# Patient Record
Sex: MALE | Race: WHITE | Employment: OTHER | URBAN - METROPOLITAN AREA
[De-identification: names, ages, dates, MRNs, and addresses within clinical notes are randomized per-mention and may not be internally consistent; named-entity substitution may affect disease eponyms.]

---

## 2020-05-13 ENCOUNTER — HOSPITAL ENCOUNTER (OUTPATIENT)
Dept: PREADMISSION TESTING | Age: 80
Discharge: HOME | End: 2020-05-13
Payer: MEDICARE

## 2020-05-13 DIAGNOSIS — M16.11 PRIMARY OSTEOARTHRITIS OF RIGHT HIP: ICD-10-CM

## 2020-05-13 LAB
ALBUMIN SERPL-MCNC: 4 G/DL (ref 3.4–5)
ALBUMIN/GLOB SERPL: 1.7 {RATIO} (ref 0.8–1.7)
ALP SERPL-CCNC: 75 U/L (ref 45–117)
ALT SERPL-CCNC: 41 U/L (ref 16–61)
ANION GAP SERPL CALC-SCNC: 3 MMOL/L (ref 3–18)
APPEARANCE UR: CLEAR
APTT PPP: 33 SEC (ref 23–36.4)
AST SERPL-CCNC: 28 U/L (ref 10–38)
BASOPHILS # BLD: 0 K/UL (ref 0–0.1)
BASOPHILS NFR BLD: 0 % (ref 0–2)
BILIRUB SERPL-MCNC: 0.8 MG/DL (ref 0.2–1)
BILIRUB UR QL: NEGATIVE
BUN SERPL-MCNC: 16 MG/DL (ref 7–18)
BUN/CREAT SERPL: 14 (ref 12–20)
CALCIUM SERPL-MCNC: 9 MG/DL (ref 8.5–10.1)
CHLORIDE SERPL-SCNC: 106 MMOL/L (ref 100–111)
CO2 SERPL-SCNC: 32 MMOL/L (ref 21–32)
COLOR UR: YELLOW
CREAT SERPL-MCNC: 1.13 MG/DL (ref 0.6–1.3)
DIFFERENTIAL METHOD BLD: ABNORMAL
EOSINOPHIL # BLD: 0.1 K/UL (ref 0–0.4)
EOSINOPHIL NFR BLD: 2 % (ref 0–5)
ERYTHROCYTE [DISTWIDTH] IN BLOOD BY AUTOMATED COUNT: 13.8 % (ref 11.6–14.5)
ERYTHROCYTE [SEDIMENTATION RATE] IN BLOOD: 3 MM/HR (ref 0–20)
EST. AVERAGE GLUCOSE BLD GHB EST-MCNC: 143 MG/DL
GLOBULIN SER CALC-MCNC: 2.3 G/DL (ref 2–4)
GLUCOSE SERPL-MCNC: 106 MG/DL (ref 74–99)
GLUCOSE UR STRIP.AUTO-MCNC: NEGATIVE MG/DL
HBA1C MFR BLD: 6.6 % (ref 4.2–5.6)
HCT VFR BLD AUTO: 43.8 % (ref 36–48)
HGB BLD-MCNC: 14.4 G/DL (ref 13–16)
HGB UR QL STRIP: NEGATIVE
INR PPP: 2.1 (ref 0.8–1.2)
KETONES UR QL STRIP.AUTO: NEGATIVE MG/DL
LEUKOCYTE ESTERASE UR QL STRIP.AUTO: NEGATIVE
LYMPHOCYTES # BLD: 1.8 K/UL (ref 0.9–3.6)
LYMPHOCYTES NFR BLD: 30 % (ref 21–52)
MCH RBC QN AUTO: 30.3 PG (ref 24–34)
MCHC RBC AUTO-ENTMCNC: 32.9 G/DL (ref 31–37)
MCV RBC AUTO: 92.2 FL (ref 74–97)
MONOCYTES # BLD: 0.5 K/UL (ref 0.05–1.2)
MONOCYTES NFR BLD: 8 % (ref 3–10)
NEUTS SEG # BLD: 3.6 K/UL (ref 1.8–8)
NEUTS SEG NFR BLD: 60 % (ref 40–73)
NITRITE UR QL STRIP.AUTO: NEGATIVE
PH UR STRIP: 7 [PH] (ref 5–8)
PLATELET # BLD AUTO: 111 K/UL (ref 135–420)
PMV BLD AUTO: 11.5 FL (ref 9.2–11.8)
POTASSIUM SERPL-SCNC: 4.2 MMOL/L (ref 3.5–5.5)
PROT SERPL-MCNC: 6.3 G/DL (ref 6.4–8.2)
PROT UR STRIP-MCNC: NEGATIVE MG/DL
PROTHROMBIN TIME: 23.5 SEC (ref 11.5–15.2)
RBC # BLD AUTO: 4.75 M/UL (ref 4.7–5.5)
SODIUM SERPL-SCNC: 141 MMOL/L (ref 136–145)
SP GR UR REFRACTOMETRY: 1.01 (ref 1–1.03)
UROBILINOGEN UR QL STRIP.AUTO: 1 EU/DL (ref 0.2–1)
WBC # BLD AUTO: 6 K/UL (ref 4.6–13.2)

## 2020-05-13 PROCEDURE — 85610 PROTHROMBIN TIME: CPT

## 2020-05-13 PROCEDURE — 85652 RBC SED RATE AUTOMATED: CPT

## 2020-05-13 PROCEDURE — 85025 COMPLETE CBC W/AUTO DIFF WBC: CPT

## 2020-05-13 PROCEDURE — 80053 COMPREHEN METABOLIC PANEL: CPT

## 2020-05-13 PROCEDURE — 36415 COLL VENOUS BLD VENIPUNCTURE: CPT

## 2020-05-13 PROCEDURE — 83036 HEMOGLOBIN GLYCOSYLATED A1C: CPT

## 2020-05-13 PROCEDURE — 85730 THROMBOPLASTIN TIME PARTIAL: CPT

## 2020-05-13 PROCEDURE — 81003 URINALYSIS AUTO W/O SCOPE: CPT

## 2020-05-13 PROCEDURE — 93005 ELECTROCARDIOGRAM TRACING: CPT

## 2020-05-13 PROCEDURE — 87635 SARS-COV-2 COVID-19 AMP PRB: CPT

## 2020-05-14 PROBLEM — M16.11 PRIMARY LOCALIZED OSTEOARTHRITIS OF RIGHT HIP: Chronic | Status: ACTIVE | Noted: 2020-05-14

## 2020-05-14 LAB
ATRIAL RATE: 71 BPM
BACTERIA SPEC CULT: NORMAL
BACTERIA SPEC CULT: NORMAL
CALCULATED R AXIS, ECG10: 63 DEGREES
CALCULATED T AXIS, ECG11: -86 DEGREES
DIAGNOSIS, 93000: NORMAL
Q-T INTERVAL, ECG07: 350 MS
QRS DURATION, ECG06: 94 MS
QTC CALCULATION (BEZET), ECG08: 413 MS
SERVICE CMNT-IMP: NORMAL
VENTRICULAR RATE, ECG03: 84 BPM

## 2020-05-14 RX ORDER — TRANEXAMIC ACID 650 1/1
1950 TABLET ORAL ONCE
Status: CANCELLED | OUTPATIENT
Start: 2020-05-14 | End: 2020-05-14

## 2020-05-14 NOTE — DISCHARGE INSTRUCTIONS
[address] [facility]   Patient Discharge Instructions    Isabella Moss / [id number] : 1940    Admitted (Not on file) Discharged: 2020     IF YOU HAVE ANY PROBLEMS ONCE YOU ARE  [facility]:   Main office number: [phone]    Your follow up appointment to see either Dr. Aubrie Acosta PA-C, or [facility] YOGESH as scheduled in 2 weeks. If you are unsure of your appointment date call the office at [phone]. Medication Instructions     · Resume your home medictions as directed, you may have directed not to resume supplements until after your follow up. · A prescription for pain medication has been given   · It is important that you take the medication exactly as they are prescribed. · Keep your medication in the bottles provided by the pharmacist and keep a list of the medication names, dosages, and times to be taken in your wallet. · Do not take other medications without consulting your doctor. What to do at [address] your prehospital diet. If you have excessive nausea or vomitting call your doctor's office. Be sure to maintain adequate fluid intake. Some pain medications may cause constipation. Remember to drink fluids, stay as active as possible, and eat plenty of fiber-rich foods. Begin In-Home Physical Therapy; 3 times a week to work on gait training, range of motion, strengthening, and weight bearing exercises as tolerable. Continue to use your walker or cane when walking. May progress from the walker to a cane to complete total bearing as tolerable. Patient may shower. Wrap incision with plastic wrap/covering to prevent incision from getting wet. Avoid complete immersion. YOUR DRESSING SHOULD BE CHANGED BY YOUR HOME HEALTH NURSE 3-5 DAYS AFTER SURGERY.           When to Call    - Call if you have a temperature greater then 101  - Unable to keep food down  - Are unable to bear any linda   - Need a pain medication refill     Information obtained by :  I understand that if any problems occur once I am at home I am to contact my physician. I understand and acknowledge receipt of the instructions indicated above.                                                                                                                                            Physician's or R.N.'s Signature                                                                  Date/Time                                                                                                                                              Patient or Representative Signature                                                          Date/Time

## 2020-05-14 NOTE — H&P
[address] [facility]  History and Physical Exam    Patient: Perry Mendoza MRN: [id number]  SSN: [id number]    YOB: 1940  Age: 79 y.o. Sex: male      Subjective:      Chief Complaint: right hip pain    History of Present Illness:  Patient complains of right hip pain and difficulty ambulating, which has progressed over the past several months. X-rays showed osteoarthritis of the joint. The patient's pain has persisted and progressed despite conservative treatments and therapies. The patient has been previously treated with nsaids. The patient has at this time opted for surgical intervention. Past Medical History:   Diagnosis Date    Aortic stenosis     Arthritis     Asthma     Atrial fibrillation (HCC)     CAD (coronary artery disease) 2020    stent x 1    COPD     Heart failure ([address] 10/05/2012    per Dr Chanelle Null' notes    Hypertension     Primary localized osteoarthritis of right hip 2020    Stroke [facility])     Vascular dementia [facility])      Past Surgical History:   Procedure Laterality Date    CARDIAC SURG PROCEDURE UNLIST      CABG 5 vessels    HX AORTIC VALVE REPLACEMENT      HX CATARACT REMOVAL Bilateral     HX HEART CATHETERIZATION  2020    stent x 1    HX LAP CHOLECYSTECTOMY       Social History     Occupational History    Not on file   Tobacco Use    Smoking status: Former Smoker     Last attempt to quit:      Years since quittin.3    Smokeless tobacco: Never Used   Substance and Sexual Activity    Alcohol use: Not Currently    Drug use: Never    Sexual activity: Not Currently     Prior to Admission medications    Medication Sig Start Date End Date Taking? Authorizing Provider   aspirin delayed-release 81 mg tablet Take 81 mg by mouth daily. Provider, Historical   clopidogreL (Plavix) 75 mg tab Take 75 mg by mouth daily.     Provider, Historical   isosorbide mononitrate ER (IMDUR) 30 mg tablet Take 30 mg by mouth daily. Provider, Historical   umeclidinium (Incruse Ellipta) 62.5 mcg/actuation inhaler Take 1 Puff by inhalation daily. Provider, Historical   atorvastatin (Lipitor) 80 mg tablet Take 80 mg by mouth nightly. Provider, Historical   latanoprost (XALATAN) 0.005 % ophthalmic solution Administer 1 Drop to left eye nightly. Provider, Historical   metoprolol succinate (TOPROL-XL) 50 mg XL tablet Take 50 mg by mouth nightly. Provider, Historical   warfarin (Coumadin) 3 mg tablet Take 3 mg by mouth daily. Provider, Historical   nitroglycerin (NITROSTAT) 0.4 mg SL tablet 0.4 mg by SubLINGual route every five (5) minutes as needed. Provider, Historical       Allergies: Allergies   Allergen Reactions    Pcn [Penicillins] Anaphylaxis    Tylenol-Codeine #3 [Acetaminophen-Codeine] Hives     Can take tylenol        Review of Systems:  A comprehensive review of systems was negative except for that written in the History of Present Illness. Objective:       Physical Exam:  HEENT: Normocephalic, atraumatic  Lungs:  Clear to auscultation  Heart:   Regular rate and rhythm  Abdomen: Soft  Extremities:  Pain with range of motion of the right hip. Passive flexion  degrees,                       passive internal rotation 0-10 degrees, with pain throughout ROM,                        passive external rotation 10-20 degrees with pain at the arc of motion. Antalgic gait noted. Assessment:      Arthritis of the right hip. Plan:       Proceed with scheduled RIGHT TOTAL HIP ARTHROPLASTY. The various methods of treatment have been discussed with the patient and family. After consideration of risks, benefits, and other options for treatment, the patient has consented to surgical interventions. Questions were answered and preoperative teaching was done by Dr Alexa Tapia.      Signed By: ELIZABETH Reza     May 14, 2020

## 2020-05-17 LAB — SARS-COV-2, COV2NT: NOT DETECTED

## 2020-05-19 ENCOUNTER — ANESTHESIA EVENT (OUTPATIENT)
Dept: SURGERY | Age: 80
End: 2020-05-19
Payer: MEDICARE

## 2020-05-19 ENCOUNTER — HOME HEALTH ADMISSION (OUTPATIENT)
Dept: HOME HEALTH SERVICES | Facility: HOME HEALTH | Age: 80
End: 2020-05-19
Payer: MEDICARE

## 2020-05-19 ENCOUNTER — APPOINTMENT (OUTPATIENT)
Dept: GENERAL RADIOLOGY | Age: 80
End: 2020-05-19
Attending: ORTHOPAEDIC SURGERY
Payer: MEDICARE

## 2020-05-19 ENCOUNTER — ANESTHESIA (OUTPATIENT)
Dept: SURGERY | Age: 80
End: 2020-05-19
Payer: MEDICARE

## 2020-05-19 ENCOUNTER — HOSPITAL ENCOUNTER (OUTPATIENT)
Age: 80
LOS: 1 days | Discharge: HOME HEALTH | End: 2020-05-19
Attending: ORTHOPAEDIC SURGERY | Admitting: ORTHOPAEDIC SURGERY
Payer: MEDICARE

## 2020-05-19 ENCOUNTER — APPOINTMENT (OUTPATIENT)
Dept: GENERAL RADIOLOGY | Age: 80
End: 2020-05-19
Attending: PHYSICIAN ASSISTANT
Payer: MEDICARE

## 2020-05-19 VITALS
HEIGHT: 71 IN | HEART RATE: 98 BPM | TEMPERATURE: 97.3 F | WEIGHT: 163.6 LBS | BODY MASS INDEX: 22.9 KG/M2 | RESPIRATION RATE: 16 BRPM | SYSTOLIC BLOOD PRESSURE: 137 MMHG | DIASTOLIC BLOOD PRESSURE: 65 MMHG | OXYGEN SATURATION: 98 %

## 2020-05-19 DIAGNOSIS — M16.11 PRIMARY LOCALIZED OSTEOARTHRITIS OF RIGHT HIP: Primary | Chronic | ICD-10-CM

## 2020-05-19 LAB
ABO + RH BLD: NORMAL
BLOOD GROUP ANTIBODIES SERPL: NORMAL
GLUCOSE BLD STRIP.AUTO-MCNC: 100 MG/DL (ref 70–110)
INR PPP: 1.1 (ref 0.8–1.2)
PROTHROMBIN TIME: 14.4 SEC (ref 11.5–15.2)
SPECIMEN EXP DATE BLD: NORMAL

## 2020-05-19 PROCEDURE — 97165 OT EVAL LOW COMPLEX 30 MIN: CPT

## 2020-05-19 PROCEDURE — 97535 SELF CARE MNGMENT TRAINING: CPT

## 2020-05-19 PROCEDURE — 74011000250 HC RX REV CODE- 250: Performed by: ORTHOPAEDIC SURGERY

## 2020-05-19 PROCEDURE — 77030031139 HC SUT VCRL2 J&J -A: Performed by: ORTHOPAEDIC SURGERY

## 2020-05-19 PROCEDURE — 74011250636 HC RX REV CODE- 250/636: Performed by: PHYSICIAN ASSISTANT

## 2020-05-19 PROCEDURE — 77030034694 HC SCPL CANADY PLSM DISP USMD -E: Performed by: ORTHOPAEDIC SURGERY

## 2020-05-19 PROCEDURE — 77030013708 HC HNDPC SUC IRR PULS STRY –B: Performed by: ORTHOPAEDIC SURGERY

## 2020-05-19 PROCEDURE — 77030003666 HC NDL SPINAL BD -A: Performed by: ORTHOPAEDIC SURGERY

## 2020-05-19 PROCEDURE — 74011000250 HC RX REV CODE- 250: Performed by: NURSE ANESTHETIST, CERTIFIED REGISTERED

## 2020-05-19 PROCEDURE — 74011250636 HC RX REV CODE- 250/636: Performed by: NURSE ANESTHETIST, CERTIFIED REGISTERED

## 2020-05-19 PROCEDURE — 77030027138 HC INCENT SPIROMETER -A: Performed by: ORTHOPAEDIC SURGERY

## 2020-05-19 PROCEDURE — 36415 COLL VENOUS BLD VENIPUNCTURE: CPT

## 2020-05-19 PROCEDURE — 77030038010: Performed by: ORTHOPAEDIC SURGERY

## 2020-05-19 PROCEDURE — 73501 X-RAY EXAM HIP UNI 1 VIEW: CPT

## 2020-05-19 PROCEDURE — C1776 JOINT DEVICE (IMPLANTABLE): HCPCS | Performed by: ORTHOPAEDIC SURGERY

## 2020-05-19 PROCEDURE — 82962 GLUCOSE BLOOD TEST: CPT

## 2020-05-19 PROCEDURE — 77030041075 HC DRSG AG OPTIFRM MDII -B: Performed by: ORTHOPAEDIC SURGERY

## 2020-05-19 PROCEDURE — 74011250636 HC RX REV CODE- 250/636: Performed by: ORTHOPAEDIC SURGERY

## 2020-05-19 PROCEDURE — 77030037713 HC CLOSR DEV INCIS ZIP STRY -B: Performed by: ORTHOPAEDIC SURGERY

## 2020-05-19 PROCEDURE — 74011250637 HC RX REV CODE- 250/637: Performed by: ANESTHESIOLOGY

## 2020-05-19 PROCEDURE — 97116 GAIT TRAINING THERAPY: CPT

## 2020-05-19 PROCEDURE — 74011250637 HC RX REV CODE- 250/637: Performed by: ORTHOPAEDIC SURGERY

## 2020-05-19 PROCEDURE — 77030020782 HC GWN BAIR PAWS FLX 3M -B: Performed by: ORTHOPAEDIC SURGERY

## 2020-05-19 PROCEDURE — 76010000149 HC OR TIME 1 TO 1.5 HR: Performed by: ORTHOPAEDIC SURGERY

## 2020-05-19 PROCEDURE — 74011250637 HC RX REV CODE- 250/637: Performed by: PHYSICIAN ASSISTANT

## 2020-05-19 PROCEDURE — 77030040361 HC SLV COMPR DVT MDII -B: Performed by: ORTHOPAEDIC SURGERY

## 2020-05-19 PROCEDURE — 77030018836 HC SOL IRR NACL ICUM -A: Performed by: ORTHOPAEDIC SURGERY

## 2020-05-19 PROCEDURE — 97161 PT EVAL LOW COMPLEX 20 MIN: CPT

## 2020-05-19 PROCEDURE — 76060000033 HC ANESTHESIA 1 TO 1.5 HR: Performed by: ORTHOPAEDIC SURGERY

## 2020-05-19 PROCEDURE — 77010033678 HC OXYGEN DAILY

## 2020-05-19 PROCEDURE — 85610 PROTHROMBIN TIME: CPT

## 2020-05-19 PROCEDURE — 86900 BLOOD TYPING SEROLOGIC ABO: CPT

## 2020-05-19 PROCEDURE — 76210000006 HC OR PH I REC 0.5 TO 1 HR: Performed by: ORTHOPAEDIC SURGERY

## 2020-05-19 PROCEDURE — 77030012508 HC MSK AIRWY LMA AMBU -A: Performed by: ANESTHESIOLOGY

## 2020-05-19 DEVICE — SIZE 16 STD COLLAR
Type: IMPLANTABLE DEVICE | Site: HIP | Status: FUNCTIONAL
Brand: ENTRADA HIP STEM

## 2020-05-19 DEVICE — FEMORAL HEAD 36-12/14+6
Type: IMPLANTABLE DEVICE | Site: HIP | Status: FUNCTIONAL
Brand: DELTA CERAMIC FEMORAL HEAD

## 2020-05-19 DEVICE — ACETABULAR LINER NEUTRAL 36/56
Type: IMPLANTABLE DEVICE | Site: HIP | Status: FUNCTIONAL
Brand: LEGEND

## 2020-05-19 DEVICE — STEM FEM PRSS FT HIP HRD SURF: Type: IMPLANTABLE DEVICE | Site: HIP | Status: FUNCTIONAL

## 2020-05-19 DEVICE — THREE HOLE, 56 MM
Type: IMPLANTABLE DEVICE | Site: HIP | Status: FUNCTIONAL
Brand: LEGEND ACETABULAR SHELL

## 2020-05-19 RX ORDER — PROPOFOL 10 MG/ML
INJECTION, EMULSION INTRAVENOUS AS NEEDED
Status: DISCONTINUED | OUTPATIENT
Start: 2020-05-19 | End: 2020-05-19 | Stop reason: HOSPADM

## 2020-05-19 RX ORDER — SODIUM CHLORIDE 9 MG/ML
125 INJECTION, SOLUTION INTRAVENOUS CONTINUOUS
Status: DISCONTINUED | OUTPATIENT
Start: 2020-05-19 | End: 2020-05-19 | Stop reason: HOSPADM

## 2020-05-19 RX ORDER — ONDANSETRON 2 MG/ML
4 INJECTION INTRAMUSCULAR; INTRAVENOUS
Status: DISCONTINUED | OUTPATIENT
Start: 2020-05-19 | End: 2020-05-19 | Stop reason: HOSPADM

## 2020-05-19 RX ORDER — HYDROMORPHONE HYDROCHLORIDE 2 MG/ML
INJECTION, SOLUTION INTRAMUSCULAR; INTRAVENOUS; SUBCUTANEOUS AS NEEDED
Status: DISCONTINUED | OUTPATIENT
Start: 2020-05-19 | End: 2020-05-19 | Stop reason: HOSPADM

## 2020-05-19 RX ORDER — SODIUM CHLORIDE 0.9 % (FLUSH) 0.9 %
5-40 SYRINGE (ML) INJECTION EVERY 8 HOURS
Status: DISCONTINUED | OUTPATIENT
Start: 2020-05-19 | End: 2020-05-19 | Stop reason: HOSPADM

## 2020-05-19 RX ORDER — ONDANSETRON 2 MG/ML
INJECTION INTRAMUSCULAR; INTRAVENOUS AS NEEDED
Status: DISCONTINUED | OUTPATIENT
Start: 2020-05-19 | End: 2020-05-19 | Stop reason: HOSPADM

## 2020-05-19 RX ORDER — FENTANYL CITRATE 50 UG/ML
25 INJECTION, SOLUTION INTRAMUSCULAR; INTRAVENOUS AS NEEDED
Status: DISCONTINUED | OUTPATIENT
Start: 2020-05-19 | End: 2020-05-19 | Stop reason: HOSPADM

## 2020-05-19 RX ORDER — OXYCODONE HYDROCHLORIDE 5 MG/1
5-10 TABLET ORAL
Status: DISCONTINUED | OUTPATIENT
Start: 2020-05-19 | End: 2020-05-19 | Stop reason: HOSPADM

## 2020-05-19 RX ORDER — CLOPIDOGREL BISULFATE 75 MG/1
75 TABLET ORAL DAILY
Status: DISCONTINUED | OUTPATIENT
Start: 2020-05-19 | End: 2020-05-19 | Stop reason: HOSPADM

## 2020-05-19 RX ORDER — ACETAMINOPHEN 325 MG/1
650 TABLET ORAL ONCE
Status: COMPLETED | OUTPATIENT
Start: 2020-05-19 | End: 2020-05-19

## 2020-05-19 RX ORDER — ALBUTEROL SULFATE 0.83 MG/ML
2.5 SOLUTION RESPIRATORY (INHALATION)
Status: DISCONTINUED | OUTPATIENT
Start: 2020-05-19 | End: 2020-05-19 | Stop reason: HOSPADM

## 2020-05-19 RX ORDER — WARFARIN 1 MG/1
3 TABLET ORAL EVERY EVENING
Status: DISCONTINUED | OUTPATIENT
Start: 2020-05-19 | End: 2020-05-19 | Stop reason: HOSPADM

## 2020-05-19 RX ORDER — DOXYCYCLINE 100 MG/1
100 TABLET ORAL 2 TIMES DAILY
Qty: 10 TAB | Refills: 0 | Status: SHIPPED | OUTPATIENT
Start: 2020-05-19 | End: 2020-05-19 | Stop reason: SDUPTHER

## 2020-05-19 RX ORDER — SODIUM CHLORIDE 0.9 % (FLUSH) 0.9 %
5-40 SYRINGE (ML) INJECTION AS NEEDED
Status: DISCONTINUED | OUTPATIENT
Start: 2020-05-19 | End: 2020-05-19 | Stop reason: HOSPADM

## 2020-05-19 RX ORDER — IPRATROPIUM BROMIDE 0.5 MG/2.5ML
0.5 SOLUTION RESPIRATORY (INHALATION) DAILY
Status: DISCONTINUED | OUTPATIENT
Start: 2020-05-19 | End: 2020-05-19 | Stop reason: HOSPADM

## 2020-05-19 RX ORDER — ZOLPIDEM TARTRATE 5 MG/1
5-10 TABLET ORAL
Status: DISCONTINUED | OUTPATIENT
Start: 2020-05-19 | End: 2020-05-19 | Stop reason: HOSPADM

## 2020-05-19 RX ORDER — DIPHENHYDRAMINE HYDROCHLORIDE 50 MG/ML
12.5 INJECTION, SOLUTION INTRAMUSCULAR; INTRAVENOUS
Status: DISCONTINUED | OUTPATIENT
Start: 2020-05-19 | End: 2020-05-19 | Stop reason: HOSPADM

## 2020-05-19 RX ORDER — OXYCODONE AND ACETAMINOPHEN 5; 325 MG/1; MG/1
1 TABLET ORAL
Qty: 60 TAB | Refills: 0 | Status: SHIPPED | OUTPATIENT
Start: 2020-05-19 | End: 2020-05-26

## 2020-05-19 RX ORDER — EPHEDRINE SULFATE/0.9% NACL/PF 50 MG/5 ML
SYRINGE (ML) INTRAVENOUS AS NEEDED
Status: DISCONTINUED | OUTPATIENT
Start: 2020-05-19 | End: 2020-05-19 | Stop reason: HOSPADM

## 2020-05-19 RX ORDER — CELECOXIB 100 MG/1
200 CAPSULE ORAL ONCE
Status: COMPLETED | OUTPATIENT
Start: 2020-05-19 | End: 2020-05-19

## 2020-05-19 RX ORDER — LANOLIN ALCOHOL/MO/W.PET/CERES
1 CREAM (GRAM) TOPICAL 3 TIMES DAILY
Status: DISCONTINUED | OUTPATIENT
Start: 2020-05-19 | End: 2020-05-19 | Stop reason: HOSPADM

## 2020-05-19 RX ORDER — HYDROMORPHONE HYDROCHLORIDE 2 MG/ML
0.5 INJECTION, SOLUTION INTRAMUSCULAR; INTRAVENOUS; SUBCUTANEOUS
Status: DISCONTINUED | OUTPATIENT
Start: 2020-05-19 | End: 2020-05-19 | Stop reason: HOSPADM

## 2020-05-19 RX ORDER — DOXYCYCLINE 100 MG/1
100 TABLET ORAL 2 TIMES DAILY
Qty: 10 TAB | Refills: 0 | Status: SHIPPED | OUTPATIENT
Start: 2020-05-19 | End: 2020-05-24

## 2020-05-19 RX ORDER — GABAPENTIN 300 MG/1
300 CAPSULE ORAL ONCE
Status: COMPLETED | OUTPATIENT
Start: 2020-05-19 | End: 2020-05-19

## 2020-05-19 RX ORDER — PANTOPRAZOLE SODIUM 40 MG/1
40 TABLET, DELAYED RELEASE ORAL DAILY
Status: DISCONTINUED | OUTPATIENT
Start: 2020-05-19 | End: 2020-05-19

## 2020-05-19 RX ORDER — ACETAMINOPHEN 325 MG/1
650 TABLET ORAL EVERY 6 HOURS
Status: DISCONTINUED | OUTPATIENT
Start: 2020-05-19 | End: 2020-05-19 | Stop reason: HOSPADM

## 2020-05-19 RX ORDER — PANTOPRAZOLE SODIUM 40 MG/1
40 TABLET, DELAYED RELEASE ORAL DAILY
Status: DISCONTINUED | OUTPATIENT
Start: 2020-05-19 | End: 2020-05-19 | Stop reason: HOSPADM

## 2020-05-19 RX ORDER — SODIUM CHLORIDE, SODIUM LACTATE, POTASSIUM CHLORIDE, CALCIUM CHLORIDE 600; 310; 30; 20 MG/100ML; MG/100ML; MG/100ML; MG/100ML
125 INJECTION, SOLUTION INTRAVENOUS CONTINUOUS
Status: DISCONTINUED | OUTPATIENT
Start: 2020-05-19 | End: 2020-05-19 | Stop reason: HOSPADM

## 2020-05-19 RX ORDER — LIDOCAINE HYDROCHLORIDE 20 MG/ML
INJECTION, SOLUTION EPIDURAL; INFILTRATION; INTRACAUDAL; PERINEURAL AS NEEDED
Status: DISCONTINUED | OUTPATIENT
Start: 2020-05-19 | End: 2020-05-19 | Stop reason: HOSPADM

## 2020-05-19 RX ORDER — NITROGLYCERIN 0.4 MG/1
0.4 TABLET SUBLINGUAL
Status: DISCONTINUED | OUTPATIENT
Start: 2020-05-19 | End: 2020-05-19 | Stop reason: HOSPADM

## 2020-05-19 RX ORDER — NALOXONE HYDROCHLORIDE 0.4 MG/ML
0.4 INJECTION, SOLUTION INTRAMUSCULAR; INTRAVENOUS; SUBCUTANEOUS AS NEEDED
Status: DISCONTINUED | OUTPATIENT
Start: 2020-05-19 | End: 2020-05-19 | Stop reason: HOSPADM

## 2020-05-19 RX ORDER — METOCLOPRAMIDE HYDROCHLORIDE 5 MG/ML
10 INJECTION INTRAMUSCULAR; INTRAVENOUS
Status: DISCONTINUED | OUTPATIENT
Start: 2020-05-19 | End: 2020-05-19 | Stop reason: HOSPADM

## 2020-05-19 RX ORDER — CEFAZOLIN SODIUM 2 G/50ML
2 SOLUTION INTRAVENOUS ONCE
Status: COMPLETED | OUTPATIENT
Start: 2020-05-19 | End: 2020-05-19

## 2020-05-19 RX ORDER — LATANOPROST 50 UG/ML
1 SOLUTION/ DROPS OPHTHALMIC
Status: DISCONTINUED | OUTPATIENT
Start: 2020-05-19 | End: 2020-05-19 | Stop reason: HOSPADM

## 2020-05-19 RX ORDER — DEXMEDETOMIDINE HYDROCHLORIDE 100 UG/ML
INJECTION, SOLUTION INTRAVENOUS AS NEEDED
Status: DISCONTINUED | OUTPATIENT
Start: 2020-05-19 | End: 2020-05-19 | Stop reason: HOSPADM

## 2020-05-19 RX ORDER — TRANEXAMIC ACID 10 MG/ML
1 INJECTION, SOLUTION INTRAVENOUS SEE ADMIN INSTRUCTIONS
Status: DISCONTINUED | OUTPATIENT
Start: 2020-05-19 | End: 2020-05-19 | Stop reason: HOSPADM

## 2020-05-19 RX ORDER — METOPROLOL SUCCINATE 50 MG/1
50 TABLET, EXTENDED RELEASE ORAL
Status: DISCONTINUED | OUTPATIENT
Start: 2020-05-19 | End: 2020-05-19 | Stop reason: HOSPADM

## 2020-05-19 RX ORDER — ACETAMINOPHEN 325 MG/1
650 TABLET ORAL ONCE
Status: DISCONTINUED | OUTPATIENT
Start: 2020-05-19 | End: 2020-05-19

## 2020-05-19 RX ORDER — CEFAZOLIN SODIUM 2 G/50ML
2 SOLUTION INTRAVENOUS EVERY 8 HOURS
Status: DISCONTINUED | OUTPATIENT
Start: 2020-05-19 | End: 2020-05-19 | Stop reason: HOSPADM

## 2020-05-19 RX ORDER — NALOXONE HYDROCHLORIDE 0.4 MG/ML
0.1 INJECTION, SOLUTION INTRAMUSCULAR; INTRAVENOUS; SUBCUTANEOUS AS NEEDED
Status: DISCONTINUED | OUTPATIENT
Start: 2020-05-19 | End: 2020-05-19 | Stop reason: HOSPADM

## 2020-05-19 RX ORDER — ATORVASTATIN CALCIUM 20 MG/1
80 TABLET, FILM COATED ORAL
Status: DISCONTINUED | OUTPATIENT
Start: 2020-05-19 | End: 2020-05-19 | Stop reason: HOSPADM

## 2020-05-19 RX ORDER — ISOSORBIDE MONONITRATE 30 MG/1
30 TABLET, EXTENDED RELEASE ORAL DAILY
Status: DISCONTINUED | OUTPATIENT
Start: 2020-05-19 | End: 2020-05-19 | Stop reason: HOSPADM

## 2020-05-19 RX ORDER — SODIUM CHLORIDE 9 MG/ML
300 INJECTION, SOLUTION INTRAVENOUS CONTINUOUS
Status: DISPENSED | OUTPATIENT
Start: 2020-05-19 | End: 2020-05-19

## 2020-05-19 RX ORDER — DEXAMETHASONE SODIUM PHOSPHATE 4 MG/ML
4 INJECTION, SOLUTION INTRA-ARTICULAR; INTRALESIONAL; INTRAMUSCULAR; INTRAVENOUS; SOFT TISSUE ONCE
Status: COMPLETED | OUTPATIENT
Start: 2020-05-19 | End: 2020-05-19

## 2020-05-19 RX ORDER — DOCUSATE SODIUM 100 MG/1
100 CAPSULE, LIQUID FILLED ORAL 2 TIMES DAILY
Status: DISCONTINUED | OUTPATIENT
Start: 2020-05-19 | End: 2020-05-19 | Stop reason: HOSPADM

## 2020-05-19 RX ORDER — ASPIRIN 81 MG/1
81 TABLET ORAL DAILY
Status: DISCONTINUED | OUTPATIENT
Start: 2020-05-19 | End: 2020-05-19 | Stop reason: HOSPADM

## 2020-05-19 RX ORDER — SODIUM CHLORIDE, SODIUM LACTATE, POTASSIUM CHLORIDE, CALCIUM CHLORIDE 600; 310; 30; 20 MG/100ML; MG/100ML; MG/100ML; MG/100ML
50 INJECTION, SOLUTION INTRAVENOUS CONTINUOUS
Status: DISCONTINUED | OUTPATIENT
Start: 2020-05-19 | End: 2020-05-19 | Stop reason: HOSPADM

## 2020-05-19 RX ADMIN — ACETAMINOPHEN 650 MG: 325 TABLET ORAL at 07:12

## 2020-05-19 RX ADMIN — PROPOFOL 200 MG: 10 INJECTION, EMULSION INTRAVENOUS at 07:35

## 2020-05-19 RX ADMIN — ONDANSETRON HYDROCHLORIDE 4 MG: 2 INJECTION INTRAMUSCULAR; INTRAVENOUS at 07:37

## 2020-05-19 RX ADMIN — SODIUM CHLORIDE, SODIUM LACTATE, POTASSIUM CHLORIDE, AND CALCIUM CHLORIDE 125 ML/HR: 600; 310; 30; 20 INJECTION, SOLUTION INTRAVENOUS at 06:20

## 2020-05-19 RX ADMIN — PANTOPRAZOLE SODIUM 40 MG: 40 TABLET, DELAYED RELEASE ORAL at 07:10

## 2020-05-19 RX ADMIN — SODIUM CHLORIDE 125 ML/HR: 900 INJECTION, SOLUTION INTRAVENOUS at 14:37

## 2020-05-19 RX ADMIN — SODIUM CHLORIDE 300 ML/HR: 900 INJECTION, SOLUTION INTRAVENOUS at 10:00

## 2020-05-19 RX ADMIN — FERROUS SULFATE TAB 325 MG (65 MG ELEMENTAL FE) 325 MG: 325 (65 FE) TAB at 10:00

## 2020-05-19 RX ADMIN — LIDOCAINE HYDROCHLORIDE 80 MG: 20 INJECTION, SOLUTION EPIDURAL; INFILTRATION; INTRACAUDAL; PERINEURAL at 07:35

## 2020-05-19 RX ADMIN — DEXMEDETOMIDINE HYDROCHLORIDE 16 MCG: 100 INJECTION, SOLUTION INTRAVENOUS at 07:46

## 2020-05-19 RX ADMIN — SODIUM CHLORIDE, SODIUM LACTATE, POTASSIUM CHLORIDE, AND CALCIUM CHLORIDE: 600; 310; 30; 20 INJECTION, SOLUTION INTRAVENOUS at 08:14

## 2020-05-19 RX ADMIN — SODIUM CHLORIDE, SODIUM LACTATE, POTASSIUM CHLORIDE, AND CALCIUM CHLORIDE 1000 ML: 600; 310; 30; 20 INJECTION, SOLUTION INTRAVENOUS at 06:20

## 2020-05-19 RX ADMIN — Medication 10 MG: at 08:18

## 2020-05-19 RX ADMIN — TRANEXAMIC ACID 1 G: 10 INJECTION, SOLUTION INTRAVENOUS at 08:17

## 2020-05-19 RX ADMIN — CEFAZOLIN SODIUM 2 G: 2 SOLUTION INTRAVENOUS at 07:29

## 2020-05-19 RX ADMIN — DEXAMETHASONE SODIUM PHOSPHATE 4 MG: 4 INJECTION, SOLUTION INTRAMUSCULAR; INTRAVENOUS at 07:12

## 2020-05-19 RX ADMIN — TRANEXAMIC ACID 1 G: 10 INJECTION, SOLUTION INTRAVENOUS at 07:37

## 2020-05-19 RX ADMIN — ASPIRIN 81 MG: 81 TABLET, COATED ORAL at 12:46

## 2020-05-19 RX ADMIN — CEFAZOLIN SODIUM 2 G: 2 SOLUTION INTRAVENOUS at 14:35

## 2020-05-19 RX ADMIN — GABAPENTIN 300 MG: 300 CAPSULE ORAL at 07:10

## 2020-05-19 RX ADMIN — CELECOXIB 200 MG: 100 CAPSULE ORAL at 07:10

## 2020-05-19 RX ADMIN — DOCUSATE SODIUM 100 MG: 100 CAPSULE, LIQUID FILLED ORAL at 12:46

## 2020-05-19 RX ADMIN — HYDROMORPHONE HYDROCHLORIDE 1 MG: 2 INJECTION, SOLUTION INTRAMUSCULAR; INTRAVENOUS; SUBCUTANEOUS at 07:35

## 2020-05-19 RX ADMIN — ACETAMINOPHEN 650 MG: 325 TABLET ORAL at 12:46

## 2020-05-19 NOTE — PERIOP NOTES
Condition stable vital signs with in limits. Requested out note from Dr. Jackelyn Meier okay to transfer patient to room. Family is aware patient does have his eyeglasses and dentures.

## 2020-05-19 NOTE — OP NOTES
[address] [address]  Total Hip Arthroplasty      Patient: Roxana Maravilla MRN: [id number]  SSN: [id number]    YOB: 1940  Age: 79 y.o. Sex: male      Date of Surgery: 5/19/2020   Preoperative Diagnosis: RIGHT HIP OSTEOARTHRITIS   Postoperative Diagnosis: RIGHT HIP OSTEOARTHRITIS   Location: [facility]  Surgeon: Bridgett Apple MD  Assistant: Delmar Anne PA-C    Anesthesia: general    Procedure: Total Right Hip Arthroplasty    Findings: Degenerative joint disease of the right hip. Estimated Blood Loss: 300ml    Specimens: None    Complications: none    Implants:   Implant Name Type Inv. Item Serial No.  Lot No. LRB No. Used Action   SHELL ACET 3H 56MM -- Dima Shakira  SHELL ACET 3H 56MM -- "[employer] [id number] Right 1 Implanted   LINER ACET NEUT 36/56MM -- LEGEND - [id number]  LINER ACET NEUT 36/56MM -- "[employer] [id number] Right 1 Implanted   STEM HIP CLLRD SZ16 STD -- ENTRADA - [id number]  STEM HIP CLLRD SZ16 STD -- ENTRADA  ORTHO [employer] [id number] Right 1 Implanted   HEAD FEM 36-12/14+6MM -- DELTA - [id number]  HEAD FEM 36-12/14+6MM -- Darvin Bruins OpenBook [id number] Right 1 Implanted       Procedure Detail:  After the patient was brought to the operating suite, He was effectively anesthetized using general anesthesia, then transferred to the Bishop table and secured in a standard fashion. His right hip was then prepped and draped in a normal sterile orthopedic fashion. He was given appropriate intravenous antibiotics preoperatively. After a proper timeout was performed, a direct anterior approach to the hip was performed using a short Farah-Vasquez interval. Anterior capsulotomy was performed. The degenerative changes of the hip were noted. Femoral neck osteotomy was then performed to the templated area. The head and neck were removed. The pulvinar and labrum were excised.  The acetabulum was then reamed up to 56 mm with good bleeding cancellous bone obtained. The cup was then irrigated with pulse lavage system. A 56 mm orthodevelopment Legend cup was then impacted in place with excellent stable fixation obtained, placing the cup at about 45 degrees of abduction, 20 degrees of anteversion. The liner was then impacted in place. A screw was not placed. Attention was turned to the femur, which was delivered into the wound with a combination of extension, external rotation, and adduction, and using the hook on the Langford table to deliver the femur into the wound. The canal was broached up to a size 16 for the Entrada stem system with excellent stable fixation obtained. A trial reduction was then performed with the standard neck offset and 36 mm head balls with various neck lengths. With the +6, he appeared to have equalization of leg lengths and restoration of offset radiographically, and excellent functional stability was noted. The trial broach was removed. The canal was irrigated with the pulse lavage system. The final components were impacted in place with excellent stable fixation obtained once again. The final reduction was performed and once again leg lengths and offset were restored radiographically, using the C-arm radiographically intraoperatively, and excellent functional stability was noted. The wound was then irrigated one more time, and then closed in layers. The fascia of the tensor was closed with #1 Vicryl in a running type stitch. Subcutaneous tissue was closed with 2-0 Vicryl in a simple buried stitch, and the skin was closed with Prineo. Dry, sterile dressing was then applied. He tolerated this well, was transferred to the bed, and taken to recovery room, extubated, in stable condition. All sponge and needle counts were correct.     Signed By: Maggie Woodall MD     May 19, 2020

## 2020-05-19 NOTE — PERIOP NOTES
TRANSFER - IN REPORT:    Verbal report received from ORN & CRNA on Romulo Pollen  being received from OR (unit) for routine post - op      Report consisted of patients Situation, Background, Assessment and   Recommendations(SBAR). Information from the following report(s) SBAR was reviewed with the receiving nurse. Opportunity for questions and clarification was provided. Assessment completed upon patients arrival to unit and care assumed.

## 2020-05-19 NOTE — PERIOP NOTES
TRANSFER - OUT REPORT:    Verbal report given to Giovani Dennis RN on Khalif Rollins  being transferred to [address] [place] [address] (unit) for routine post - op       Report consisted of patients Situation, Background, Assessment and   Recommendations(SBAR). Information from the following report(s) SBAR was reviewed with the receiving nurse. Lines:   Peripheral IV 05/19/20 Right Forearm (Active)   Site Assessment Clean, dry, & intact 5/19/2020  9:05 AM   Phlebitis Assessment 0 5/19/2020  9:05 AM   Infiltration Assessment 0 5/19/2020  9:05 AM   Dressing Status Clean, dry, & intact 5/19/2020  9:05 AM   Dressing Type Tape;Transparent 5/19/2020  9:05 AM   Hub Color/Line Status Infusing 5/19/2020  9:05 AM        Opportunity for questions and clarification was provided.       Patient transported with:   Registered Nurse

## 2020-05-19 NOTE — PROGRESS NOTES
Problem: Self Care Deficits Care Plan (Adult)  Goal: *Acute Goals and Plan of Care (Insert Text)  Description: Initial Occupational Therapy Goals (5/19/2020) Within 7 day(s):    1. Patient will perform grooming standing sinkside with supervision for increased independence in ADLs. 2. Patient will perform UB dressing with mod I seated EOB for increased independence with ADLs. 3. Patient will perform LB dressing with supervision & A/E PRN for increased independence with ADLs. 4. Patient will perform all aspects of toileting with supervision for increased independence with ADLs. 5. Patient will perform LB ADLs utilizing body mechanics & adaptive strategies with 1 verbal cue for increased safety in ADLs. 6. Patient will independently apply energy conservation techniques with 1 verbal cue(s)for increased independence with ADLs. Outcome: Progressing Towards Goal   OCCUPATIONAL THERAPY EVALUATION    Patient: Destiny Joe (22 y.o. male)  Date: 5/19/2020  Primary Diagnosis: Primary localized osteoarthritis of right hip [M16.11]  Procedure(s) (LRB):  RIGHT TOTAL HIP REPLACMENT ANTERIOR APPROACH WITH C-ARM  **SPEC POP** (Right) Day of Surgery   Precautions: Fall, WBAT  PLOF: pt mod I for ADLs, living with spouse    ASSESSMENT AND RECOMMENDATIONS:  Based on the objective data described below, the patient presents with RLE decreased ROM and strength affecting LE ADLs. Patient able to utilize LLE ankle-over-knee technique and bending forward with RLE for sock donning. Pt able to thread B feet through underwear/pants without assist, and CGA when standing to pull up to waist. Pt completed bathroom mobility/toilet transfer with CGA.  Pt ambulated back to bed with CGA, and was able to return to supine without assist. Patient will benefit from skilled Occupational Therapy intervention to maximize safety/independence with ADLs/functional mobility at d/c.    Education: Reviewed home safety, body mechanics, importance of moving every hour to prevent joint stiffness, role of ice for edema/pain control, Rolling Walker management/safety, and adaptive dressing techniques with patient verbalizing  understanding at this time     Patient will benefit from skilled intervention to address the above impairments. Patient's rehabilitation potential is considered to be Good  Factors which may influence rehabilitation potential include:   []             None noted  []             Mental ability/status  []             Medical condition  []             Home/family situation and support systems  [x]             Safety awareness  []             Pain tolerance/management  []             Other:        PLAN :  Recommendations and Planned Interventions:   [x]               Self Care Training                  [x]      Therapeutic Activities  [x]               Functional Mobility Training   []      Cognitive Retraining  [x]               Therapeutic Exercises           [x]      Endurance Activities  [x]               Balance Training                    []      Neuromuscular Re-Education  []               Visual/Perceptual Training     [x]      Home Safety Training  [x]               Patient Education                   []      Family Training/Education  []               Other (comment):    Frequency/Duration: Patient will be followed by Occupational Therapy 1-2 times per day/4-7 days per week to address goals. Discharge Recommendations: Home Health  Further Equipment Recommendations for Discharge: N/A     SUBJECTIVE:   Patient stated i'm feeling pretty good.     OBJECTIVE DATA SUMMARY:     Past Medical History:   Diagnosis Date    Aortic stenosis 2012    Arthritis     Asthma     Atrial fibrillation (HCC)     CAD (coronary artery disease) 02/2020    stent x 1    COPD     Heart failure ([facility] [address] 10/05/2012    per Dr Ofelia Jimenez' notes    Hypertension     Primary localized osteoarthritis of right hip 5/14/2020    Stroke [facility])     Vascular dementia ([facility] [address] Past Surgical History:   Procedure Laterality Date    CARDIAC SURG PROCEDURE UNLIST  1997    CABG 5 vessels    HX AORTIC VALVE REPLACEMENT  2013    HX CATARACT REMOVAL Bilateral     HX HEART CATHETERIZATION  02/2020    stent x 1    HX LAP CHOLECYSTECTOMY       Barriers to Learning/Limitations: yes;  physical  Compensate with: visual, verbal, tactile, kinesthetic cues/model    Home Situation/Prior Level of Function: pt mod I for ADLs, living with spouse in two story home, tub/shower with SC  Home Situation  Home Environment: Patient room  # Steps to Enter: 4  Rails to Enter: Yes  Hand Rails : Bilateral  One/Two Story Residence: Two story  # of Interior Steps: 15  Interior Rails: Left  Lift Chair Available: No  Living Alone: No  Support Systems: Spouse/Significant Other/Partner  Patient Expects to be Discharged to[de-identified] Private residence  Current DME Used/Available at Home: Walker, rolling  Tub or Shower Type: Tub/Shower combination  []  Right hand dominant   []  Left hand dominant    Cognitive/Behavioral Status:  Neurologic State: Alert  Orientation Level: Oriented X4  Cognition: Appropriate decision making; Appropriate for age attention/concentration; Appropriate safety awareness; Follows commands  Safety/Judgement: Awareness of environment    Skin: R hip incision w/ Mepilex   Edema: compression hose in place & applied ice     Coordination: BUE  Coordination: Within functional limits  Fine Motor Skills-Upper: Left Intact; Right Intact    Gross Motor Skills-Upper: Left Intact; Right Intact    Balance:  Sitting: Intact  Standing: Intact; With support    Strength: BUE  Strength: Generally decreased, functional    Tone & Sensation:BUE  Tone: Normal  Sensation: Intact    Range of Motion: BUE  AROM: Generally decreased, functional    Functional Mobility and Transfers for ADLs:  Bed Mobility:  Supine to Sit: Stand-by assistance  Sit to Supine: Stand-by assistance  Scooting: Supervision  Transfers:  Sit to Stand: Contact guard assistance   Toilet Transfer : Contact guard assistance   Bathroom Mobility: Contact guard assistance    ADL Assessment:  Feeding: Setup    Oral Facial Hygiene/Grooming: Setup    Bathing: Contact guard assistance    Upper Body Dressing: Supervision    Lower Body Dressing: Contact guard assistance    Toileting: Stand by assistance    ADL Intervention:  Upper Body Dressing Assistance  Dressing Assistance: Supervision  Pullover Shirt: Supervision    Lower Body Dressing Assistance  Dressing Assistance: Contact guard assistance  Underpants: Contact guard assistance  Pants With Elastic Waist: Contact guard assistance  Position Performed: Seated edge of bed  Cues: Tactile cues provided;Verbal cues provided;Visual cues provided    Cognitive Retraining  Safety/Judgement: Awareness of environment    Pain:  Pain level pre-treatment: 0/10  Pain level post-treatment: 0/10  Pain Intervention(s): Medication administer by Nursing (see MAR); Rest, Ice, Repositioning   Response to intervention: Nurse notified, see doc flow     Activity Tolerance:   Fair-. Patient able to stand ~3 minute(s). Patient able to complete ADLs with intermittent rest breaks. Patient limited by strength, ROM, balance. Patient unsteady. Please refer to the flowsheet for vital signs taken during this treatment. After treatment:   []  Patient left in no apparent distress sitting up in chair  []  Patient sitting on EOB  [x]  Patient left in no apparent distress in bed  [x]  Call bell left within reach  [x]  Nursing notified  []  Caregiver present  [x]  Ice applied  []  SCD's on while back in bed  [] Bed alarm activated    COMMUNICATION/EDUCATION:   Communication/Collaboration:  [x]       Role of Occupational Therapy in the acute care setting. [x]      Home safety education was provided and the patient/caregiver indicated understanding. [x]      Patient/family have participated as able in goal setting and plan of care.   [x]      Patient/family agree to work toward stated goals and plan of care. []      Patient understands intent and goals of therapy, but is neutral about his/her participation. []      Patient is unable to participate in plan of care at this time. Thank you for this referral.  Samy Stout OTR/L  Time Calculation: 23 mins    Eval Complexity: History: MEDIUM Complexity : Expanded review of history including physical, cognitive and psychosocial  history ; Examination: LOW Complexity : 1-3 performance deficits relating to physical, cognitive , or psychosocial skils that result in activity limitations and / or participation restrictions ;    Decision Making:LOW Complexity : No comorbidities that affect functional and no verbal or physical assistance needed to complete eval tasks

## 2020-05-19 NOTE — ANESTHESIA POSTPROCEDURE EVALUATION
Procedure(s):  RIGHT TOTAL HIP REPLACMENT ANTERIOR APPROACH WITH C-ARM  **SPEC POP**.    general    Anesthesia Post Evaluation      Multimodal analgesia: multimodal analgesia not used between 6 hours prior to anesthesia start to PACU discharge  Patient location during evaluation: PACU  Patient participation: complete - patient participated  Level of consciousness: awake  Pain management: adequate  Airway patency: patent  Anesthetic complications: no  Cardiovascular status: acceptable  Respiratory status: acceptable  Hydration status: acceptable  Post anesthesia nausea and vomiting:  none      Vitals Value Taken Time   /85 5/19/2020  9:47 AM   Temp 36.3 °C (97.3 °F) 5/19/2020  9:47 AM   Pulse 97 5/19/2020  9:47 AM   Resp 15 5/19/2020  9:47 AM   SpO2 98 % 5/19/2020  9:47 AM

## 2020-05-19 NOTE — PROGRESS NOTES
Discharge instructions reviewed with the patient. Patient verbalized understanding and verified by teach back. All questions answered. IV discontinued, no redness, swelling or pain noted. Patient awaiting daughterfor transportation home, with an ETA of 5 min.  Patient armband removed and shredded

## 2020-05-19 NOTE — INTERVAL H&P NOTE
Update History & Physical 
 
The Patient's History and Physical of May 14,2020,  
  was reviewed with the patient and I examined the patient. There was no change. The surgical site was confirmed by the patient and me. Plan:  The risk, benefits, expected outcome, and alternative to the recommended procedure have been discussed with the patient. Patient understands and wants to proceed with the procedure.  
 
Electronically signed by Casey Odonnell MD on 5/19/2020 at 6:53 AM

## 2020-05-19 NOTE — PROGRESS NOTES
Problem: Mobility Impaired (Adult and Pediatric)  Goal: *Acute Goals and Plan of Care (Insert Text)  Description: In 1-7 days pt will be able to perform:  ST.  Bed mobility:  Rolling L to R to L modified independent for positioning. 2.  Supine to sit to supine S with HR for meals. 3.  Sit to stand to sit S with RW in prep for ambulation. LT.  Gait:  Ambulate >150ft S with RW, WBAT, for home/community mobility. 2.  Stair Negotiation:  Ascend/descend >1 step CGA with HR for home entry. 3.  Activity tolerance: Tolerate up in chair 1-2 hours for ADLs. 4.  Patient/Family Education:  Patient/family to be independent with HEP for follow-up care and safe discharge. Note:   PHYSICAL THERAPY EVALUATION & DISCHARGE    Patient: Aj Barahona (59 y.o. male)  Date: 2020  Primary Diagnosis: Primary localized osteoarthritis of right hip [M16.11]  Procedure(s) (LRB):  RIGHT TOTAL HIP REPLACMENT ANTERIOR APPROACH WITH C-ARM  **SPEC POP** (Right) Day of Surgery   Precautions:   Fall, WBAT    ASSESSMENT AND RECOMMENDATIONS:  Based on the objective data described below, the patient presents with decreased functional mobility and independence in regard to bed mobility, transfers, gt quality and tolerance, R hip AROM, R hip strength, pain, balance, activity tolerance, stair negotiation and safety due to recent R TERESA surgery. Pt rating pain on numerical pain scale 1/10. BP sitting EOB 91/57, pt denies symptoms. Pt required SBA/S for supine<>sit and CGA/SBA/S for sit<>stand. Pt required vc for safe techniques. Pt able to participate in gt training w/ RW, WBAT, GB and CGA/SBA/S in hallway w/ antalgic pattern. Pt able to participate in stair training using step to pattern. Pt returned to sitting EOB to eat meal w/ all needs within reach and ice pack to R hip. Nurse Denia Duggan aware. Recommend [facility] upon hospital d/c. Pt is ready to transition to PT.   Skilled acute physical therapy is not indicated at this time. Discharge Recommendations: Home Health  Further Equipment Recommendations for Discharge: N/A      SUBJECTIVE:   Patient stated \"Suprisingly I feel pretty good.     OBJECTIVE DATA SUMMARY:     Past Medical History:   Diagnosis Date    Aortic stenosis 2012    Arthritis     Asthma     Atrial fibrillation (HCC)     CAD (coronary artery disease) 02/2020    stent x 1    COPD     Heart failure ([address] 10/05/2012    per Dr Chanelle Null' notes    Hypertension     Primary localized osteoarthritis of right hip 5/14/2020    Stroke [facility])     Vascular dementia [facility])      Past Surgical History:   Procedure Laterality Date    CARDIAC SURG PROCEDURE UNLIST  1997    CABG 5 vessels    HX AORTIC VALVE REPLACEMENT  2013    HX CATARACT REMOVAL Bilateral     HX HEART CATHETERIZATION  02/2020    stent x 1    HX LAP CHOLECYSTECTOMY       Barriers to Learning/Limitations: None  Compensate with: visual, verbal, tactile, kinesthetic cues/model  Prior Level of Function/Home Situation:   Home Situation  Home Environment: Private residence  # Steps to Enter: 4  Rails to Enter: Yes  Hand Rails : Bilateral  One/Two Story Residence: Two story  # of Interior Steps: 15  Interior Rails: Left  Lift Chair Available: No  Living Alone: No  Support Systems: Spouse/Significant Other/Partner  Patient Expects to be Discharged to[de-identified] Private residence  Current DME Used/Available at Home: Walker, rolling  Critical Behavior:  Neurologic State: Alert; Appropriate for age  Orientation Level: Oriented X4  Cognition: Appropriate decision making; Appropriate for age attention/concentration; Appropriate safety awareness; Follows commands  Safety/Judgement: Awareness of environment  Psychosocial  Patient Behaviors: Calm; Cooperative  Purposeful Interaction: Yes  Pt Identified Daily Priority: Clinical issues (comment)  Caritas Process: Nurture loving kindness  Caring Interventions: Reassure; Therapeutic modalities  Reassure: Therapeutic listening; Informing  Therapeutic Modalities: Deep breathing  Skin Condition/Temp: Warm;Dry  Skin Integrity: Incision (comment)(R hip)  Skin Integumentary  Skin Color: Appropriate for ethnicity  Skin Condition/Temp: Warm;Dry  Skin Integrity: Incision (comment)(R hip)  Turgor: Non-tenting  Hair Growth: Present  Varicosities: Absent  Strength:    Strength: Generally decreased, functional  Tone & Sensation:   Tone: Normal  Sensation: Intact  Range Of Motion:  AROM: Generally decreased, functional  Functional Mobility:  Bed Mobility:  Supine to Sit: Stand-by assistance;Supervision  Scooting: Supervision  Transfers:  Sit to Stand: Contact guard assistance;Stand-by assistance;Supervision(vc)  Stand to Sit: Stand-by assistance;Supervision(vc)  Balance:   Sitting: Intact  Standing: Intact; With support  Ambulation/Gait Training:  Distance (ft): 150 Feet (ft)  Assistive Device: Walker, rolling;Gait belt  Ambulation - Level of Assistance: Supervision  Gait Abnormalities: Antalgic;Decreased step clearance  Right Side Weight Bearing: As tolerated  Base of Support: Shift to left  Stance: Right decreased  Speed/Yumiko: Slow  Step Length: Left shortened;Right shortened  Swing Pattern: Left asymmetrical;Right asymmetrical  Interventions: Safety awareness training; Tactile cues; Verbal cues; Visual/Demos  Therapeutic Exercises:   Reviewed HEP w/ pt for pt competency  Pain:  Pain Scale 1: Numeric (0 - 10)  Pain Intensity 1: 1  Pain Location 1: Hip  Pain Orientation 1: Right  Pain Description 1: Aching;Constant  Pain Intervention(s) 1: Declines;Nurse notified  Activity Tolerance:   Fair   Please refer to the flowsheet for vital signs taken during this treatment.   After treatment:   [x]         Patient left in no apparent distress sitting up EOB  []         Patient left in no apparent distress in bed  [x]         Call bell left within reach  [x]         Nursing notified  []         Caregiver present  []         Bed alarm activated    COMMUNICATION/EDUCATION:   [x] Fall prevention education was provided and the patient/caregiver indicated understanding. [x]         Patient/family have participated as able in goal setting and plan of care. [x]         Patient/family agree to work toward stated goals and plan of care. []         Patient understands intent and goals of therapy, but is neutral about his/her participation. []         Patient is unable to participate in goal setting and plan of care.     Thank you for this referral.  Jhonny Giron, PT   Time Calculation: 23 mins        Eval Complexity: History: HIGH Complexity :3+ comorbidities / personal factors will impact the outcome/ POC Exam:MEDIUM Complexity : 3 Standardized tests and measures addressing body structure, function, activity limitation and / or participation in recreation  Presentation: LOW Complexity : Stable, uncomplicated  Clinical Decision Making:Low Complexity    Overall Complexity:LOW

## 2020-05-19 NOTE — DISCHARGE SUMMARY
[address] [address]     DISCHARGE SUMMARY     PATIENT: Yaquelin Daly     MRN: [id number]   ADMIT DATE: 2020   [facility]LL[facility]   DISCHARGE DATE:      ATTENDING: Alex Kimbrough MD   DICTATING: ELIZABETH Shelton     ADMISSION DIAGNOSIS: Primary localized osteoarthritis of right hip [M16.11]    DISCHARGE DIAGNOSIS: Status post RIGHT TOTAL HIP ARTHROPLASTY    HISTORY OF PRESENT ILLNESS: The patient is a 79y.o. year-old male   with ongoing right hip pain secondary to osteoarthritis of right hip. The patient's pain has persisted and progressed despite conservative treatments and therapies. The patient has at this time opted for surgical intervention. PAST MEDICAL HISTORY:   Past Medical History:   Diagnosis Date    Aortic stenosis     Arthritis     Asthma     Atrial fibrillation (HCC)     CAD (coronary artery disease) 2020    stent x 1    COPD     Heart failure ([address] 10/05/2012    per Dr Shraddha Grey' notes    Hypertension     Primary localized osteoarthritis of right hip 2020    Stroke [facility])     Vascular dementia ([address]        PAST SURGICAL HISTORY:   Past Surgical History:   Procedure Laterality Date    CARDIAC SURG PROCEDURE UNLIST      CABG 5 vessels    HX AORTIC VALVE REPLACEMENT      HX CATARACT REMOVAL Bilateral     HX HEART CATHETERIZATION  2020    stent x 1    HX LAP CHOLECYSTECTOMY         ALLERGIES:   Allergies   Allergen Reactions    Pcn [Penicillins] Anaphylaxis    Tylenol-Codeine #3 [Acetaminophen-Codeine] Hives     Can take tylenol        CURRENT MEDICATIONS:  A list of medications prior to the time of admission include:  Prior to Admission medications    Medication Sig Start Date End Date Taking? Authorizing Provider   oxyCODONE-acetaminophen (PERCOCET) 5-325 mg per tablet Take 1 Tab by mouth every four (4) hours as needed for Pain for up to 7 days. Max Daily Amount: 6 Tabs. 20 Yes Constantine Seo PA   doxycycline (ADOXA) 100 mg tablet Take 1 Tab by mouth two (2) times a day for 5 days. 20 Yes Constantine Seo PA   isosorbide mononitrate ER (IMDUR) 30 mg tablet Take 30 mg by mouth daily. Yes Provider, Historical   umeclidinium (Incruse Ellipta) 62.5 mcg/actuation inhaler Take 1 Puff by inhalation daily. Yes Provider, Historical   atorvastatin (Lipitor) 80 mg tablet Take 80 mg by mouth nightly. Yes Provider, Historical   latanoprost (XALATAN) 0.005 % ophthalmic solution Administer 1 Drop to left eye nightly. Yes Provider, Historical   metoprolol succinate (TOPROL-XL) 50 mg XL tablet Take 50 mg by mouth nightly. Yes Provider, Historical   aspirin delayed-release 81 mg tablet Take 81 mg by mouth daily. Provider, Historical   clopidogreL (Plavix) 75 mg tab Take 75 mg by mouth daily. Provider, Historical   warfarin (Coumadin) 3 mg tablet Take 3 mg by mouth daily. Provider, Historical   nitroglycerin (NITROSTAT) 0.4 mg SL tablet 0.4 mg by SubLINGual route every five (5) minutes as needed. Provider, Historical       FAMILY HISTORY: History reviewed. No pertinent family history. SOCIAL HISTORY:   Social History     Socioeconomic History    Marital status:      Spouse name: Not on file    Number of children: Not on file    Years of education: Not on file    Highest education level: Not on file   Tobacco Use    Smoking status: Former Smoker     Last attempt to quit:      Years since quittin.4    Smokeless tobacco: Never Used   Substance and Sexual Activity    Alcohol use: Not Currently    Drug use: Never    Sexual activity: Not Currently       REVIEW OF SYSTEMS: All review of systems are negative. PHYSICAL EXAMINATION: For a detailed physical exam, please refer to the patient's chart. HOSPITAL COURSE: The patient was taken to surgery the day of admission.  he underwent right total hip replacement via the anterior approach. Operative course was benign. Estimated blood loss approximately 300 cc. The patient was taken to the PACU in stable condition and was later taken to the floor in stable condition. Post-op Day of surgery, patient has done very well.  he has had little to no pain. he had been cleared by physical therapy with stair training. he was placed on Aspirin and plavix for DVT prophylaxis. his vitals have remained stable. he has also remained hemodynamically stable. The patient has been recommended for discharge home. DISCHARGE INSTRUCTIONS: The patient is to be discharged home. he is to continue on his prior medications per the medication reconciliation form, to which we will add:           1)  Percocet 5/325 mg; 1 tablets p.o. every 4 hours p.r.n. for pain         2)  Doxycycline 100 mg; 1 tablet p.o. every 12 hours x 5  days    The patient is to continue at home with home physical therapy 3 times a week to work on gait training, range of motion, strengthening, and weightbearing exercises as tolerated on his right lower extremity. The patient is to progress from a walker to a cane to complete total weightbearing as tolerable. The patient is to continue to keep his incision dry. The patient is to followup with Dr. Alonso Wellington, Anil Hoffman PA-C, and/or [facility] in the office approximately 10-14 days status post for x-rays and further evaluation.       Stefanie Hillman 1723, [address]  5/89/[id number] AM

## 2020-05-19 NOTE — PROGRESS NOTES
2013- Patient arrives to unit at this time. Dual skin assessment completed with Starlett Bruin, no abnormalities noted besides surgical incision to right hip, fall risk armband in place. Admission assessment completed. Patient is A/O x 4. Lungs clear, radial pulses present , pedal pulses present , abdomen soft and non-distended. Bowel sounds active, 18 G IV to RFA  intact and patent infusing. No signs of phlebitis or infiltration noted. TEDs and SCDs applied bilateraly. Skin warm and dry  with  mepilex dressing to right hip CDI. Patient denies numbness/tingling. Pain 1/10. Patient was oriented to the room to include use of call bell, meal ordering, and use of incentive spirometer, patient able to get IS to 1500. Patient was given explanation of \" up for dinner\" program and has verbalized understanding. Phone and call bell left within reach. Plan of care for the day addressed with patient. Educated on pain medication availability and possible side effects as well as need to call for assistance before getting out of bed, patient verbalized understanding.

## 2020-05-19 NOTE — PERIOP NOTES
Verbal hand off at the bedside with Radha Hernandez provided opportunity for questions. Dual skin assessment and patient has his dentures and eyeglasses.

## 2020-05-19 NOTE — PROGRESS NOTES
Transition of Care (MYESHA) Plan:     Noted discharge. Spoke with pt and wife via phone into room. Verified information via face sheet. FOC offered, pt chose [facility] [phone] for follow up; referral placed with CMS. Pt has RW for home. MYESHA Transportation:   How is patient being transported at discharge? Family/Friend      When? Once cleared by Therapy between 3-5pm     Is transport scheduled? N/A      Follow-up appointment and transportation:   PCP/Specialist?  See AVS for Appointment         Who is transporting to the follow-up appointment? Family/Friend      Is transport for follow up appointment scheduled? N/A    Communication plan (with patient/family): Who is being called? Patient or Next of Kin? Responsible party? Patient      What number(s) is to be used? See Facesheet      What service provider is calling for [facility]? When are they calling? 24-48 hours following discharge    Readmission Risk? (Green/Low; Yellow/Moderate; Red/High):  Green    Care Management Interventions  PCP Verified by CM:  Yes  Transition of Care Consult (CM Consult): [address]: Yes  Discharge Durable Medical Equipment: No  Physical Therapy Consult: Yes  Occupational Therapy Consult: Yes  Current Support Network: Lives with Spouse  Confirm Follow Up Transport: Family  The Plan for Transition of Care is Related to the Following Treatment Goals : HH  The Patient and/or Patient Representative was Provided with a Choice of Provider and Agrees with the Discharge Plan?: Yes  Freedom of Choice List was Provided with Basic Dialogue that Supports the Patient's Individualized Plan of Care/Goals, Treatment Preferences and Shares the Quality Data Associated with the Providers?: Yes  Discharge Location  Discharge Placement: Home with home health

## 2020-05-19 NOTE — ANESTHESIA PREPROCEDURE EVALUATION
Relevant Problems   No relevant active problems       Anesthetic History     Other anesthesia complications     Comments: Cognitive dysfunction     Review of Systems / Medical History  Patient summary reviewed and pertinent labs reviewed    Pulmonary    COPD: mild        Asthma : well controlled       Neuro/Psych       CVA: no residual symptoms  TIA     Cardiovascular    Hypertension  Valvular problems/murmurs      Dysrhythmias : atrial fibrillation  Past MI, CAD and cardiac stents      Comments: H/o AS, valve replacement   GI/Hepatic/Renal  Within defined limits              Endo/Other        Arthritis     Other Findings              Physical Exam    Airway  Mallampati: II  TM Distance: 4 - 6 cm  Neck ROM: normal range of motion   Mouth opening: Normal     Cardiovascular    Rhythm: irregular  Rate: normal         Dental    Dentition: Lower partial plate and Upper partial plate     Pulmonary  Breath sounds clear to auscultation               Abdominal  GI exam deferred       Other Findings            Anesthetic Plan    ASA: 3  Anesthesia type: general  Patient preference         Induction: Intravenous  Anesthetic plan and risks discussed with: Patient

## 2020-05-20 ENCOUNTER — HOME CARE VISIT (OUTPATIENT)
Dept: SCHEDULING | Facility: HOME HEALTH | Age: 80
End: 2020-05-20
Payer: MEDICARE

## 2020-05-20 ENCOUNTER — HOME CARE VISIT (OUTPATIENT)
Dept: HOME HEALTH SERVICES | Facility: HOME HEALTH | Age: 80
End: 2020-05-20
Payer: MEDICARE

## 2020-05-20 VITALS
RESPIRATION RATE: 16 BRPM | DIASTOLIC BLOOD PRESSURE: 60 MMHG | SYSTOLIC BLOOD PRESSURE: 110 MMHG | OXYGEN SATURATION: 98 % | HEART RATE: 72 BPM | TEMPERATURE: 98.8 F

## 2020-05-20 VITALS
SYSTOLIC BLOOD PRESSURE: 110 MMHG | WEIGHT: 165 LBS | HEIGHT: 71 IN | RESPIRATION RATE: 16 BRPM | TEMPERATURE: 98.8 F | OXYGEN SATURATION: 98 % | DIASTOLIC BLOOD PRESSURE: 60 MMHG | BODY MASS INDEX: 23.1 KG/M2 | HEART RATE: 72 BPM

## 2020-05-20 PROCEDURE — G0299 HHS/HOSPICE OF RN EA 15 MIN: HCPCS

## 2020-05-20 PROCEDURE — 3331090001 HH PPS REVENUE CREDIT

## 2020-05-20 PROCEDURE — A6213 FOAM DRG >16<=48 SQ IN W/BDR: HCPCS

## 2020-05-20 PROCEDURE — G0151 HHCP-SERV OF PT,EA 15 MIN: HCPCS

## 2020-05-20 PROCEDURE — 400013 HH SOC

## 2020-05-20 PROCEDURE — 3331090002 HH PPS REVENUE DEBIT

## 2020-05-21 ENCOUNTER — HOME CARE VISIT (OUTPATIENT)
Dept: HOME HEALTH SERVICES | Facility: HOME HEALTH | Age: 80
End: 2020-05-21
Payer: MEDICARE

## 2020-05-21 PROCEDURE — 3331090002 HH PPS REVENUE DEBIT

## 2020-05-21 PROCEDURE — 3331090001 HH PPS REVENUE CREDIT

## 2020-05-22 ENCOUNTER — HOME CARE VISIT (OUTPATIENT)
Dept: SCHEDULING | Facility: HOME HEALTH | Age: 80
End: 2020-05-22
Payer: MEDICARE

## 2020-05-22 VITALS
SYSTOLIC BLOOD PRESSURE: 118 MMHG | DIASTOLIC BLOOD PRESSURE: 64 MMHG | HEART RATE: 110 BPM | OXYGEN SATURATION: 98 % | TEMPERATURE: 99.1 F

## 2020-05-22 PROCEDURE — G0157 HHC PT ASSISTANT EA 15: HCPCS

## 2020-05-22 PROCEDURE — 3331090001 HH PPS REVENUE CREDIT

## 2020-05-22 PROCEDURE — 3331090002 HH PPS REVENUE DEBIT

## 2020-05-23 ENCOUNTER — HOME CARE VISIT (OUTPATIENT)
Dept: HOME HEALTH SERVICES | Facility: HOME HEALTH | Age: 80
End: 2020-05-23
Payer: MEDICARE

## 2020-05-23 VITALS
DIASTOLIC BLOOD PRESSURE: 64 MMHG | HEART RATE: 126 BPM | OXYGEN SATURATION: 97 % | SYSTOLIC BLOOD PRESSURE: 114 MMHG | TEMPERATURE: 97.5 F

## 2020-05-23 PROCEDURE — 3331090001 HH PPS REVENUE CREDIT

## 2020-05-23 PROCEDURE — 3331090002 HH PPS REVENUE DEBIT

## 2020-05-23 PROCEDURE — G0157 HHC PT ASSISTANT EA 15: HCPCS

## 2020-05-24 PROCEDURE — 3331090002 HH PPS REVENUE DEBIT

## 2020-05-24 PROCEDURE — 3331090001 HH PPS REVENUE CREDIT

## 2020-05-25 ENCOUNTER — HOME CARE VISIT (OUTPATIENT)
Dept: SCHEDULING | Facility: HOME HEALTH | Age: 80
End: 2020-05-25
Payer: MEDICARE

## 2020-05-25 VITALS
DIASTOLIC BLOOD PRESSURE: 60 MMHG | OXYGEN SATURATION: 97 % | SYSTOLIC BLOOD PRESSURE: 120 MMHG | TEMPERATURE: 97.8 F | HEART RATE: 101 BPM

## 2020-05-25 PROCEDURE — G0157 HHC PT ASSISTANT EA 15: HCPCS

## 2020-05-25 PROCEDURE — 3331090001 HH PPS REVENUE CREDIT

## 2020-05-25 PROCEDURE — 3331090002 HH PPS REVENUE DEBIT

## 2020-05-26 ENCOUNTER — HOME CARE VISIT (OUTPATIENT)
Dept: SCHEDULING | Facility: HOME HEALTH | Age: 80
End: 2020-05-26
Payer: MEDICARE

## 2020-05-26 VITALS
SYSTOLIC BLOOD PRESSURE: 130 MMHG | DIASTOLIC BLOOD PRESSURE: 60 MMHG | HEART RATE: 98 BPM | TEMPERATURE: 98.9 F | OXYGEN SATURATION: 98 % | RESPIRATION RATE: 16 BRPM

## 2020-05-26 PROCEDURE — 3331090001 HH PPS REVENUE CREDIT

## 2020-05-26 PROCEDURE — G0299 HHS/HOSPICE OF RN EA 15 MIN: HCPCS

## 2020-05-26 PROCEDURE — 3331090002 HH PPS REVENUE DEBIT

## 2020-05-27 ENCOUNTER — HOME CARE VISIT (OUTPATIENT)
Dept: SCHEDULING | Facility: HOME HEALTH | Age: 80
End: 2020-05-27
Payer: MEDICARE

## 2020-05-27 VITALS
TEMPERATURE: 97.1 F | SYSTOLIC BLOOD PRESSURE: 118 MMHG | DIASTOLIC BLOOD PRESSURE: 58 MMHG | HEART RATE: 107 BPM | OXYGEN SATURATION: 98 %

## 2020-05-27 PROCEDURE — 3331090002 HH PPS REVENUE DEBIT

## 2020-05-27 PROCEDURE — G0157 HHC PT ASSISTANT EA 15: HCPCS

## 2020-05-27 PROCEDURE — 3331090001 HH PPS REVENUE CREDIT

## 2020-05-28 PROCEDURE — 3331090002 HH PPS REVENUE DEBIT

## 2020-05-28 PROCEDURE — 3331090001 HH PPS REVENUE CREDIT

## 2020-05-29 ENCOUNTER — HOME CARE VISIT (OUTPATIENT)
Dept: SCHEDULING | Facility: HOME HEALTH | Age: 80
End: 2020-05-29
Payer: MEDICARE

## 2020-05-29 VITALS
TEMPERATURE: 97.9 F | HEART RATE: 93 BPM | DIASTOLIC BLOOD PRESSURE: 60 MMHG | OXYGEN SATURATION: 98 % | SYSTOLIC BLOOD PRESSURE: 120 MMHG

## 2020-05-29 PROCEDURE — 3331090001 HH PPS REVENUE CREDIT

## 2020-05-29 PROCEDURE — G0157 HHC PT ASSISTANT EA 15: HCPCS

## 2020-05-29 PROCEDURE — G0299 HHS/HOSPICE OF RN EA 15 MIN: HCPCS

## 2020-05-29 PROCEDURE — 3331090002 HH PPS REVENUE DEBIT

## 2020-05-30 PROCEDURE — 3331090002 HH PPS REVENUE DEBIT

## 2020-05-30 PROCEDURE — 3331090001 HH PPS REVENUE CREDIT

## 2020-05-31 VITALS
HEART RATE: 93 BPM | TEMPERATURE: 97.9 F | DIASTOLIC BLOOD PRESSURE: 64 MMHG | SYSTOLIC BLOOD PRESSURE: 116 MMHG | OXYGEN SATURATION: 98 % | RESPIRATION RATE: 16 BRPM

## 2020-05-31 PROCEDURE — 3331090001 HH PPS REVENUE CREDIT

## 2020-05-31 PROCEDURE — 3331090002 HH PPS REVENUE DEBIT

## 2020-06-01 ENCOUNTER — HOME CARE VISIT (OUTPATIENT)
Dept: SCHEDULING | Facility: HOME HEALTH | Age: 80
End: 2020-06-01
Payer: MEDICARE

## 2020-06-01 VITALS
OXYGEN SATURATION: 98 % | DIASTOLIC BLOOD PRESSURE: 68 MMHG | TEMPERATURE: 96.2 F | HEART RATE: 101 BPM | SYSTOLIC BLOOD PRESSURE: 118 MMHG

## 2020-06-01 PROCEDURE — G0157 HHC PT ASSISTANT EA 15: HCPCS

## 2020-06-01 PROCEDURE — 3331090001 HH PPS REVENUE CREDIT

## 2020-06-01 PROCEDURE — 3331090002 HH PPS REVENUE DEBIT

## 2020-06-02 ENCOUNTER — HOME CARE VISIT (OUTPATIENT)
Dept: SCHEDULING | Facility: HOME HEALTH | Age: 80
End: 2020-06-02
Payer: MEDICARE

## 2020-06-02 PROCEDURE — G0299 HHS/HOSPICE OF RN EA 15 MIN: HCPCS

## 2020-06-03 VITALS
TEMPERATURE: 98.3 F | HEART RATE: 69 BPM | OXYGEN SATURATION: 98 % | RESPIRATION RATE: 14 BRPM | DIASTOLIC BLOOD PRESSURE: 60 MMHG | SYSTOLIC BLOOD PRESSURE: 116 MMHG

## 2020-06-04 ENCOUNTER — HOME CARE VISIT (OUTPATIENT)
Dept: HOME HEALTH SERVICES | Facility: HOME HEALTH | Age: 80
End: 2020-06-04
Payer: MEDICARE

## 2020-06-05 ENCOUNTER — HOME CARE VISIT (OUTPATIENT)
Dept: SCHEDULING | Facility: HOME HEALTH | Age: 80
End: 2020-06-05
Payer: MEDICARE

## 2020-06-05 PROCEDURE — G0151 HHCP-SERV OF PT,EA 15 MIN: HCPCS

## 2020-06-06 VITALS
SYSTOLIC BLOOD PRESSURE: 118 MMHG | HEART RATE: 96 BPM | TEMPERATURE: 98.9 F | OXYGEN SATURATION: 97 % | DIASTOLIC BLOOD PRESSURE: 68 MMHG

## (undated) DEVICE — NEEDLE SPNL 20GA L3.5IN YEL HUB S STL REG WALL FIT STYL W/

## (undated) DEVICE — PACK PROCEDURE SURG TOT HIP ANTR CARTER CUST

## (undated) DEVICE — THE CANADY HYBRID PLASMA SCALPEL IS AN ELECTROSURGICAL PLASMA SCALPEL THAT USES AN 85MM BENDABLE PADDLE BLADE TIP. THE ELECTROSURGICAL PLASMA SCALPEL IS USED TO SIMULTANEOUSLY CUT AND COAGULATE BIOLOGICAL TISSUE.: Brand: CANADY HYBRID PLASMA PADDLE BLADE

## (undated) DEVICE — HANDPIECE SET WITH HIGH FLOW TIP AND SUCTION TUBE: Brand: INTERPULSE

## (undated) DEVICE — BLADE SAW 1.27X13X90 MM FOR LG BNE

## (undated) DEVICE — GARMENT,MEDLINE,DVT,INT,CALF,MED, GEN2: Brand: MEDLINE

## (undated) DEVICE — SOL INJ L R 1000ML BG --

## (undated) DEVICE — SUT VCRL + 1 36IN CT1 VIO --

## (undated) DEVICE — SUT VCRL + 2-0 36IN CT1 UD --

## (undated) DEVICE — SOL IRRIGATION INJ NACL 0.9% 500ML BTL

## (undated) DEVICE — DRESSING ALG W4XL8IN AG FOAM SUPERABSORBENT SIL ANTIMIC

## (undated) DEVICE — ZIP 16 SURGICAL SKIN CLOSURE DEVICE: Brand: ZIP 16 SURGICAL SKIN CLOSURE DEVICE